# Patient Record
Sex: MALE | Race: WHITE | NOT HISPANIC OR LATINO | Employment: OTHER | URBAN - METROPOLITAN AREA
[De-identification: names, ages, dates, MRNs, and addresses within clinical notes are randomized per-mention and may not be internally consistent; named-entity substitution may affect disease eponyms.]

---

## 2021-09-01 ENCOUNTER — HOSPITAL ENCOUNTER (EMERGENCY)
Facility: HOSPITAL | Age: 46
Discharge: HOME | End: 2021-09-01
Attending: EMERGENCY MEDICINE | Admitting: EMERGENCY MEDICINE

## 2021-09-01 ENCOUNTER — APPOINTMENT (OUTPATIENT)
Dept: GENERAL RADIOLOGY | Facility: HOSPITAL | Age: 46
End: 2021-09-01

## 2021-09-01 VITALS
OXYGEN SATURATION: 99 % | BODY MASS INDEX: 26.11 KG/M2 | HEIGHT: 75 IN | HEART RATE: 81 BPM | TEMPERATURE: 97.3 F | DIASTOLIC BLOOD PRESSURE: 98 MMHG | WEIGHT: 210 LBS | RESPIRATION RATE: 24 BRPM | SYSTOLIC BLOOD PRESSURE: 168 MMHG

## 2021-09-01 DIAGNOSIS — S61.012A THUMB LACERATION, LEFT, INITIAL ENCOUNTER: Primary | ICD-10-CM

## 2021-09-01 PROCEDURE — 73140 X-RAY EXAM OF FINGER(S): CPT

## 2021-09-01 PROCEDURE — 99283 EMERGENCY DEPT VISIT LOW MDM: CPT

## 2021-09-01 PROCEDURE — 90471 IMMUNIZATION ADMIN: CPT | Performed by: NURSE PRACTITIONER

## 2021-09-01 PROCEDURE — 90715 TDAP VACCINE 7 YRS/> IM: CPT | Performed by: NURSE PRACTITIONER

## 2021-09-01 PROCEDURE — 25010000002 TDAP 5-2.5-18.5 LF-MCG/0.5 SUSPENSION: Performed by: NURSE PRACTITIONER

## 2021-09-01 RX ORDER — BUPIVACAINE HYDROCHLORIDE 5 MG/ML
10 INJECTION, SOLUTION EPIDURAL; INTRACAUDAL ONCE
Status: COMPLETED | OUTPATIENT
Start: 2021-09-01 | End: 2021-09-01

## 2021-09-01 RX ORDER — LIDOCAINE HYDROCHLORIDE 10 MG/ML
10 INJECTION, SOLUTION EPIDURAL; INFILTRATION; INTRACAUDAL; PERINEURAL ONCE
Status: COMPLETED | OUTPATIENT
Start: 2021-09-01 | End: 2021-09-01

## 2021-09-01 RX ORDER — AMOXICILLIN AND CLAVULANATE POTASSIUM 875; 125 MG/1; MG/1
1 TABLET, FILM COATED ORAL EVERY 12 HOURS
Qty: 20 TABLET | Refills: 0 | Status: SHIPPED | OUTPATIENT
Start: 2021-09-01

## 2021-09-01 RX ADMIN — BUPIVACAINE HYDROCHLORIDE 10 ML: 5 INJECTION, SOLUTION EPIDURAL; INTRACAUDAL; PERINEURAL at 16:28

## 2021-09-01 RX ADMIN — LIDOCAINE HYDROCHLORIDE 10 ML: 10 INJECTION, SOLUTION EPIDURAL; INFILTRATION; INTRACAUDAL; PERINEURAL at 16:27

## 2021-09-01 RX ADMIN — TETANUS TOXOID, REDUCED DIPHTHERIA TOXOID AND ACELLULAR PERTUSSIS VACCINE, ADSORBED 0.5 ML: 5; 2.5; 8; 8; 2.5 SUSPENSION INTRAMUSCULAR at 15:49

## 2021-09-01 NOTE — ED PROVIDER NOTES
Subjective   Antonio Diego is a 46 yr old male presents to the emergency department for complaints of a left thumb laceration.  Patient explains that he was using a table saw to cut wood and caught his left thumb.  Patient is right-handed.  He denies any previous injury to the left thumb.  Patient has full range of motion of the digit.  Bleeding is controlled.  He denies any numbness, tingling.      History provided by:  Patient   used: No    Finger Laceration  Location:  Lt thumb  Severity:  Moderate  Timing:  Constant  Progression:  Unchanged  Worsened by:  Movement      Review of Systems   Musculoskeletal:        Lt thumb laceration/ pain   Skin: Positive for wound (lt thumb).   Neurological: Negative for weakness and numbness.       Past Medical History:   Diagnosis Date   • DDD (degenerative disc disease), lumbar        No Known Allergies    History reviewed. No pertinent surgical history.    History reviewed. No pertinent family history.    Social History     Socioeconomic History   • Marital status:      Spouse name: Not on file   • Number of children: Not on file   • Years of education: Not on file   • Highest education level: Not on file   Tobacco Use   • Smoking status: Current Some Day Smoker     Packs/day: 2.00     Types: Cigarettes   • Smokeless tobacco: Never Used   Substance and Sexual Activity   • Alcohol use: Never   • Drug use: Never           Objective   Physical Exam  Vitals and nursing note reviewed.   Constitutional:       Appearance: Normal appearance. He is not ill-appearing.   HENT:      Head: Normocephalic and atraumatic.      Nose: Nose normal.      Mouth/Throat:      Mouth: Mucous membranes are moist.   Eyes:      Extraocular Movements: Extraocular movements intact.      Pupils: Pupils are equal, round, and reactive to light.   Cardiovascular:      Rate and Rhythm: Normal rate and regular rhythm.      Pulses: Normal pulses.      Heart sounds: Normal heart  sounds.   Pulmonary:      Effort: Pulmonary effort is normal.      Breath sounds: Normal breath sounds.   Musculoskeletal:      Cervical back: Normal range of motion.      Comments: Lt thumb:  Distal thumb has an irregular laceration.  Some of the tissue is missing.  Nailbed is intact. The bleeding is controlled.  +ROM.  +senastion intact.   Skin:     Comments: Lt thumb:  Distal thumb has an irregular laceration.  Some of the tissue is missing.  Nailbed is intact. The bleeding is controlled.  +ROM.  +senastion intact.     Neurological:      Mental Status: He is alert and oriented to person, place, and time.   Psychiatric:         Mood and Affect: Mood normal.         Behavior: Behavior normal.         Laceration Repair    Date/Time: 9/1/2021 3:30 PM  Performed by: Alma Lam APRN  Authorized by: Christofer Hudson MD     Consent:     Consent obtained:  Verbal    Consent given by:  Patient    Risks discussed:  Poor cosmetic result, poor wound healing and infection    Alternatives discussed:  No treatment  Anesthesia (see MAR for exact dosages):     Anesthesia method:  Local infiltration    Local anesthetic:  Lidocaine 1% w/o epi and bupivacaine 0.5% w/o epi  Laceration details:     Location:  Finger    Finger location:  L thumb    Length (cm):  2.5  Repair type:     Repair type:  Intermediate  Pre-procedure details:     Preparation:  Patient was prepped and draped in usual sterile fashion and imaging obtained to evaluate for foreign bodies  Exploration:     Hemostasis achieved with:  Direct pressure    Wound exploration: wound explored through full range of motion and entire depth of wound probed and visualized      Contaminated: yes    Treatment:     Area cleansed with:  Betadine and saline    Amount of cleaning:  Extensive    Irrigation solution:  Sterile saline    Irrigation volume:  Copius    Irrigation method:  Syringe    Visualized foreign bodies/material removed: no    Skin repair:     Repair  "method:  Sutures    Suture size:  4-0    Suture material:  Prolene    Suture technique:  Simple interrupted    Number of sutures:  9  Approximation:     Approximation:  Close  Post-procedure details:     Dressing:  Splint for protection    Patient tolerance of procedure:  Tolerated well, no immediate complications               ED Course  ED Course as of Sep 01 1959   Wed Sep 01, 2021   1602 I personally evaluated the patient with the tablesaw laceration to the end of the left thumb.  Images pending.  I advised on increased risk of infection associated with organic particles that may remain within the wound.  We will thoroughly clean the wound and closed primarily.  See note from nurse practitioner for details.    [RS]   1609 I personally reviewed the 3 views of the left thumb demonstrating soft tissue disruption but no bony abnormality.  See report for radiology for details.   XR Finger 2+ View Left [RS]      ED Course User Index  [RS] Christofer Hudson MD           No results found for this or any previous visit (from the past 24 hour(s)).  Note: In addition to lab results from this visit, the labs listed above may include labs taken at another facility or during a different encounter within the last 24 hours. Please correlate lab times with ED admission and discharge times for further clarification of the services performed during this visit.    XR Finger 2+ View Left   Final Result   There is no acute bony abnormality. Laceration identified   distal with irregularity of the soft tissues.       D:  09/01/2021   E:  09/01/2021       This report was finalized on 9/1/2021 5:05 PM by Dr. Jamilah Mcintyre MD.            Vitals:    09/01/21 1531   BP: 168/98   BP Location: Right arm   Patient Position: Sitting   Pulse: 81   Resp: 24   Temp: 97.3 °F (36.3 °C)   TempSrc: Oral   SpO2: 99%   Weight: 95.3 kg (210 lb)   Height: 190.5 cm (75\")     Medications   Tdap (BOOSTRIX) injection 0.5 mL (0.5 mL Intramuscular " Given 9/1/21 1549)   lidocaine PF 1% (XYLOCAINE) injection 10 mL (10 mL Infiltration Given by Other 9/1/21 1627)   bupivacaine (PF) (MARCAINE) 0.5 % injection 10 mL (10 mL Injection Given by Other 9/1/21 1628)     ECG/EMG Results (last 24 hours)     ** No results found for the last 24 hours. **        No orders to display                                       MDM    Final diagnoses:   Thumb laceration, left, initial encounter       ED Disposition  ED Disposition     ED Disposition Condition Comment    Discharge Stable           Provider, No Known  [place] [id number]          Lalito Alcazar MD  [address]   [place] [id number] [phone]               Medication List      New Prescriptions    amoxicillin-clavulanate 875-125 MG per tablet  Commonly known as: AUGMENTIN  Take 1 tablet by mouth Every 12 (Twelve) Hours.           Where to Get Your Medications      [facility] [place] [address] - [address] [facility] [address] [facility] - [phone]  - [phone] FX  [address] [facility] [address] [place], [place] [address]    Phone: [phone]   · amoxicillin-clavulanate 875-125 MG per tablet          Alma Lam, ANATOLY  09/01/21 2000